# Patient Record
Sex: FEMALE | Employment: OTHER | URBAN - METROPOLITAN AREA
[De-identification: names, ages, dates, MRNs, and addresses within clinical notes are randomized per-mention and may not be internally consistent; named-entity substitution may affect disease eponyms.]

---

## 2019-02-26 ENCOUNTER — CONSULTATION (OUTPATIENT)
Dept: URBAN - METROPOLITAN AREA CLINIC 11 | Facility: CLINIC | Age: 69
End: 2019-02-26

## 2019-02-26 ASSESSMENT — VISUAL ACUITY
OS_SC: 20/80
OD_SC: 20/30

## 2019-02-26 ASSESSMENT — TONOMETRY
OS_IOP_MMHG: 15
OD_IOP_MMHG: 13

## 2019-02-26 NOTE — PATIENT DISCUSSION
Greater than 50% of exam spent in face to face dialogue with Mrs. Annette Baker and her sister. 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 Mrs. Annette Baker understands the need for surgery and would like to schedule in the near future. All questions and concerns adressed at length. We will be calling her to get her on the schedule. I have explained that due to the nature of this membrane that her vision may not get back to where it was before, but should definitely improve.

## 2019-04-11 ENCOUNTER — FOLLOW UP (OUTPATIENT)
Dept: URBAN - METROPOLITAN AREA CLINIC 11 | Facility: CLINIC | Age: 69
End: 2019-04-11

## 2019-04-11 ASSESSMENT — VISUAL ACUITY
OS_SC: 20/80
OD_SC: 20/30

## 2019-04-11 ASSESSMENT — TONOMETRY: OS_IOP_MMHG: 16

## 2019-10-01 ENCOUNTER — FOLLOW UP (OUTPATIENT)
Dept: URBAN - METROPOLITAN AREA CLINIC 11 | Facility: CLINIC | Age: 69
End: 2019-10-01

## 2019-10-01 RX ORDER — DUREZOL 0.5 MG/ML
1 EMULSION OPHTHALMIC
Start: 2019-10-01

## 2019-10-01 ASSESSMENT — VISUAL ACUITY
OD_SC: 20/20-1
OS_SC: 20/70-2
OS_PH: 20/60+2

## 2019-10-01 ASSESSMENT — TONOMETRY
OD_IOP_MMHG: 15
OS_IOP_MMHG: 16

## 2019-10-01 NOTE — PATIENT DISCUSSION
I have recommended that Mrs. Malachi Acevedo use Durezol three times a day in the left eye for the recurrent edema. I will see her back in 2 months for re-evaluation with OCT.

## 2019-12-03 ENCOUNTER — FOLLOW UP (OUTPATIENT)
Dept: URBAN - METROPOLITAN AREA CLINIC 11 | Facility: CLINIC | Age: 69
End: 2019-12-03

## 2019-12-03 RX ORDER — BROMFENAC SODIUM 0.7 MG/ML
1 SOLUTION/ DROPS OPHTHALMIC TWICE A DAY
Start: 2019-12-03

## 2019-12-03 ASSESSMENT — TONOMETRY
OD_IOP_MMHG: 14
OS_IOP_MMHG: 14

## 2019-12-03 ASSESSMENT — VISUAL ACUITY
OS_SC: 20/70
OD_SC: 20/20-2
OS_PH: 20/60+2

## 2019-12-03 NOTE — PROCEDURE NOTE: CLINICAL
PROCEDURE NOTE: Sub-Tenon’s Triamcinolone Left Lower Lid. Prior to injection, risks/benefits/alternatives discussed including infection, loss of vision, hemorrhage, cataract, glaucoma, elevated intraocular pressure and lid swelling. Betadine prep was performed. Sub-Tenon’s injection of Triamcinolone 40 mg/1 ml was given. Patient tolerated procedure well. There were no complications. Post-op instructions given. Tracking # *. Lot #: null. Expiration Date: . EXP. Patient given office phone number/answering service number and advised to call immediately should there be an increase in floaters or redness, loss of vision or pain, or should they have any other questions or concerns. Inventory Id: null. Obdulia Queen

## 2019-12-03 NOTE — PATIENT DISCUSSION
I have recommended that she have a sub-tenon injection of Kenalog in the left eye while she is here. I have discontinued the Durezol and will start her on Prolensa, BID OS.

## 2020-03-03 ENCOUNTER — FOLLOW UP (OUTPATIENT)
Dept: URBAN - METROPOLITAN AREA CLINIC 11 | Facility: CLINIC | Age: 70
End: 2020-03-03

## 2020-03-03 ASSESSMENT — TONOMETRY: OS_IOP_MMHG: 08

## 2020-03-03 ASSESSMENT — VISUAL ACUITY
OS_PH: 20/50
OS_SC: 20/60
OD_SC: 20/20-2

## 2020-03-03 NOTE — PATIENT DISCUSSION
She has responded well to treatment and I have asked her to begin using the Prolensa once a day. I will see her again in 2 months.

## 2020-07-14 ENCOUNTER — FOLLOW UP (OUTPATIENT)
Dept: URBAN - METROPOLITAN AREA CLINIC 11 | Facility: CLINIC | Age: 70
End: 2020-07-14

## 2020-07-14 ASSESSMENT — VISUAL ACUITY
OS_SC: 20/70
OD_SC: 20/25

## 2020-07-14 ASSESSMENT — TONOMETRY
OS_IOP_MMHG: 13
OD_IOP_MMHG: 12

## 2020-09-29 ENCOUNTER — FOLLOW UP (OUTPATIENT)
Dept: URBAN - METROPOLITAN AREA CLINIC 11 | Facility: CLINIC | Age: 70
End: 2020-09-29

## 2020-09-29 RX ORDER — BIMATOPROST 0.3 MG/ML: 1 SOLUTION/ DROPS OPHTHALMIC

## 2020-09-29 ASSESSMENT — VISUAL ACUITY
OD_SC: 20/25-2
OS_SC: 20/50-2

## 2020-09-29 ASSESSMENT — TONOMETRY: OS_IOP_MMHG: 15

## 2020-09-29 NOTE — PATIENT DISCUSSION
She has responded well to treatment and I have asked her to continue using the Prolensa once a day. I will see her again in 3 months.

## 2021-02-09 ENCOUNTER — FOLLOW UP (OUTPATIENT)
Dept: URBAN - METROPOLITAN AREA CLINIC 11 | Facility: CLINIC | Age: 71
End: 2021-02-09

## 2021-02-09 ASSESSMENT — VISUAL ACUITY
OS_SC: 20/60+1
OS_PH: 20/50+2
OD_SC: 20/25-2

## 2021-02-09 ASSESSMENT — TONOMETRY
OD_IOP_MMHG: 15
OS_IOP_MMHG: 19

## 2021-07-06 ENCOUNTER — FOLLOW UP (OUTPATIENT)
Dept: URBAN - METROPOLITAN AREA CLINIC 11 | Facility: CLINIC | Age: 71
End: 2021-07-06

## 2021-07-06 DIAGNOSIS — H43.822: ICD-10-CM

## 2021-07-06 DIAGNOSIS — H35.81: ICD-10-CM

## 2021-07-06 DIAGNOSIS — H35.373: ICD-10-CM

## 2021-07-06 PROCEDURE — 92134 CPTRZ OPH DX IMG PST SGM RTA: CPT

## 2021-07-06 PROCEDURE — 99214 OFFICE O/P EST MOD 30 MIN: CPT

## 2021-07-06 ASSESSMENT — TONOMETRY
OD_IOP_MMHG: 14
OS_IOP_MMHG: 15

## 2021-07-06 ASSESSMENT — VISUAL ACUITY
OS_SC: 20/50-2
OD_SC: 20/20-2

## 2021-07-06 NOTE — PATIENT DISCUSSION
Spent 35 minutes in face to face dialogue with patient. No edema seen today and no treatment needed today.

## 2023-01-17 ENCOUNTER — FOLLOW UP (OUTPATIENT)
Dept: URBAN - METROPOLITAN AREA CLINIC 11 | Facility: CLINIC | Age: 73
End: 2023-01-17

## 2023-01-17 DIAGNOSIS — H35.81: ICD-10-CM

## 2023-01-17 DIAGNOSIS — H35.373: ICD-10-CM

## 2023-01-17 PROCEDURE — 99214 OFFICE O/P EST MOD 30 MIN: CPT

## 2023-01-17 PROCEDURE — 92134 CPTRZ OPH DX IMG PST SGM RTA: CPT

## 2023-01-17 ASSESSMENT — TONOMETRY
OD_IOP_MMHG: 17
OS_IOP_MMHG: 17

## 2023-01-17 ASSESSMENT — VISUAL ACUITY
OD_SC: 20/25-2
OS_SC: 20/100

## 2023-01-17 NOTE — PATIENT DISCUSSION
Small amount of swelling found on exam and OCT today. Recommend starting Bromfenac BID sent in office. Also recommend patient use Refresh drops prn.

## 2023-06-22 ENCOUNTER — COMPREHENSIVE EXAM (OUTPATIENT)
Dept: URBAN - METROPOLITAN AREA CLINIC 11 | Facility: CLINIC | Age: 73
End: 2023-06-22

## 2023-06-22 DIAGNOSIS — H35.81: ICD-10-CM

## 2023-06-22 DIAGNOSIS — H43.821: ICD-10-CM

## 2023-06-22 DIAGNOSIS — H35.373: ICD-10-CM

## 2023-06-22 DIAGNOSIS — Z96.1: ICD-10-CM

## 2023-06-22 PROCEDURE — 99214 OFFICE O/P EST MOD 30 MIN: CPT

## 2023-06-22 PROCEDURE — 92134 CPTRZ OPH DX IMG PST SGM RTA: CPT

## 2023-06-22 ASSESSMENT — TONOMETRY
OD_IOP_MMHG: 12
OS_IOP_MMHG: 19

## 2023-06-22 ASSESSMENT — VISUAL ACUITY
OD_SC: 20/30+2
OS_PH: 20/60-2
OS_SC: 20/100

## 2023-10-05 ENCOUNTER — COMPREHENSIVE EXAM (OUTPATIENT)
Dept: URBAN - METROPOLITAN AREA CLINIC 11 | Facility: CLINIC | Age: 73
End: 2023-10-05

## 2023-10-05 DIAGNOSIS — H35.371: ICD-10-CM

## 2023-10-05 DIAGNOSIS — Z96.1: ICD-10-CM

## 2023-10-05 DIAGNOSIS — H43.821: ICD-10-CM

## 2023-10-05 PROCEDURE — 99214 OFFICE O/P EST MOD 30 MIN: CPT

## 2023-10-05 PROCEDURE — 92134 CPTRZ OPH DX IMG PST SGM RTA: CPT

## 2023-10-05 ASSESSMENT — VISUAL ACUITY
OS_PH: 20/70+1
OS_SC: 20/80
OD_SC: 20/30+2

## 2023-10-05 ASSESSMENT — TONOMETRY
OD_IOP_MMHG: 13
OS_IOP_MMHG: 16

## 2024-04-18 ENCOUNTER — ESTABLISHED PATIENT (OUTPATIENT)
Dept: URBAN - METROPOLITAN AREA CLINIC 11 | Facility: CLINIC | Age: 74
End: 2024-04-18

## 2024-04-18 DIAGNOSIS — H35.373: ICD-10-CM

## 2024-04-18 DIAGNOSIS — Z96.1: ICD-10-CM

## 2024-04-18 DIAGNOSIS — H43.823: ICD-10-CM

## 2024-04-18 DIAGNOSIS — H35.352: ICD-10-CM

## 2024-04-18 PROCEDURE — 99214 OFFICE O/P EST MOD 30 MIN: CPT

## 2024-04-18 PROCEDURE — 92134 CPTRZ OPH DX IMG PST SGM RTA: CPT

## 2024-04-18 ASSESSMENT — VISUAL ACUITY
OS_SC: 20/80
OD_SC: 20/40+2

## 2024-04-18 ASSESSMENT — TONOMETRY
OS_IOP_MMHG: 14
OD_IOP_MMHG: 10